# Patient Record
Sex: FEMALE | Race: WHITE | Employment: OTHER | URBAN - METROPOLITAN AREA
[De-identification: names, ages, dates, MRNs, and addresses within clinical notes are randomized per-mention and may not be internally consistent; named-entity substitution may affect disease eponyms.]

---

## 2021-05-04 PROBLEM — M26.609 TMJ DYSFUNCTION: Status: ACTIVE | Noted: 2021-05-04

## 2021-05-04 PROBLEM — G50.9 TRIGEMINAL NEUROPATHY: Status: ACTIVE | Noted: 2021-05-04

## 2021-05-12 ENCOUNTER — HOSPITAL ENCOUNTER (OUTPATIENT)
Dept: MRI IMAGING | Age: 71
Discharge: HOME | End: 2021-05-12
Attending: PSYCHIATRY & NEUROLOGY
Payer: MEDICARE

## 2021-05-12 DIAGNOSIS — G50.9 TRIGEMINAL NEUROPATHY: ICD-10-CM

## 2021-05-12 DIAGNOSIS — M26.609 TMJ DYSFUNCTION: ICD-10-CM

## 2021-05-12 PROCEDURE — 74011636320 HC RX REV CODE- 636/320: Performed by: PSYCHIATRY & NEUROLOGY

## 2021-05-12 PROCEDURE — 70553 MRI BRAIN STEM W/O & W/DYE: CPT

## 2021-05-12 PROCEDURE — A9576 INJ PROHANCE MULTIPACK: HCPCS | Performed by: PSYCHIATRY & NEUROLOGY

## 2021-05-12 RX ORDER — SODIUM CHLORIDE 0.9 % (FLUSH) 0.9 %
10 SYRINGE (ML) INJECTION
Status: COMPLETED | OUTPATIENT
Start: 2021-05-12 | End: 2021-05-12

## 2021-05-12 RX ADMIN — GADOTERIDOL 10 ML: 279.3 INJECTION, SOLUTION INTRAVENOUS at 12:33

## 2021-05-12 RX ADMIN — Medication 10 ML: at 12:33

## 2021-06-11 PROBLEM — G50.0 TRIGEMINAL NEURALGIA OF LEFT SIDE OF FACE: Status: ACTIVE | Noted: 2021-06-11

## 2022-03-18 PROBLEM — G50.9 TRIGEMINAL NEUROPATHY: Status: ACTIVE | Noted: 2021-05-04

## 2022-03-19 PROBLEM — G50.0 TRIGEMINAL NEURALGIA OF LEFT SIDE OF FACE: Status: ACTIVE | Noted: 2021-06-11

## 2022-03-19 PROBLEM — M26.609 TMJ DYSFUNCTION: Status: ACTIVE | Noted: 2021-05-04

## 2022-07-05 ENCOUNTER — TELEPHONE (OUTPATIENT)
Dept: NEUROLOGY | Age: 72
End: 2022-07-05

## 2022-07-05 DIAGNOSIS — R13.10 DYSPHAGIA, UNSPECIFIED TYPE: Primary | ICD-10-CM

## 2022-07-05 NOTE — TELEPHONE ENCOUNTER
Patient called stating that she is having lots of issues swallowing. States she has not been able to take the new medication she was prescribed, she could not verify which medication it was only that it is 25 mg. States that at her last appointment she was given a shot which usually helps but this time she has not seen a difference. Patient also states that this is caused by an issue with her mandibular nerve which is causing issue with her trigeminal nerve. Patient states that when she eats or tries to take meds her \"throat draws up\" and she has issues swallowing. Patient is requesting callback, pls adv.

## 2022-07-20 ENCOUNTER — OFFICE VISIT (OUTPATIENT)
Dept: NEUROLOGY | Age: 72
End: 2022-07-20

## 2022-07-20 ENCOUNTER — OFFICE VISIT (OUTPATIENT)
Dept: NEUROLOGY | Age: 72
End: 2022-07-20
Payer: MEDICARE

## 2022-07-20 VITALS — SYSTOLIC BLOOD PRESSURE: 134 MMHG | DIASTOLIC BLOOD PRESSURE: 77 MMHG | HEART RATE: 64 BPM

## 2022-07-20 DIAGNOSIS — M62.838 MUSCLE SPASM: ICD-10-CM

## 2022-07-20 DIAGNOSIS — R13.10 DYSPHAGIA, UNSPECIFIED TYPE: Primary | ICD-10-CM

## 2022-07-20 DIAGNOSIS — G50.9 TRIGEMINAL NEUROPATHY: Primary | ICD-10-CM

## 2022-07-20 DIAGNOSIS — G50.0 TRIGEMINAL NEURALGIA OF LEFT SIDE OF FACE: ICD-10-CM

## 2022-07-20 PROCEDURE — 1123F ACP DISCUSS/DSCN MKR DOCD: CPT | Performed by: PSYCHIATRY & NEUROLOGY

## 2022-07-20 PROCEDURE — 1090F PRES/ABSN URINE INCON ASSESS: CPT | Performed by: PSYCHIATRY & NEUROLOGY

## 2022-07-20 PROCEDURE — G8400 PT W/DXA NO RESULTS DOC: HCPCS | Performed by: PSYCHIATRY & NEUROLOGY

## 2022-07-20 PROCEDURE — 3017F COLORECTAL CA SCREEN DOC REV: CPT | Performed by: PSYCHIATRY & NEUROLOGY

## 2022-07-20 PROCEDURE — 1036F TOBACCO NON-USER: CPT | Performed by: PSYCHIATRY & NEUROLOGY

## 2022-07-20 PROCEDURE — G8421 BMI NOT CALCULATED: HCPCS | Performed by: PSYCHIATRY & NEUROLOGY

## 2022-07-20 PROCEDURE — 99214 OFFICE O/P EST MOD 30 MIN: CPT | Performed by: PSYCHIATRY & NEUROLOGY

## 2022-07-20 PROCEDURE — G8427 DOCREV CUR MEDS BY ELIG CLIN: HCPCS | Performed by: PSYCHIATRY & NEUROLOGY

## 2022-07-20 ASSESSMENT — ENCOUNTER SYMPTOMS
RESPIRATORY NEGATIVE: 1
EYES NEGATIVE: 1
BACK PAIN: 1
GASTROINTESTINAL NEGATIVE: 1

## 2022-07-20 ASSESSMENT — VISUAL ACUITY: VA_NORMAL: 1

## 2022-07-20 NOTE — PROGRESS NOTES
Speech Pathology    Patient arrived for scheduled evaluation but very confused as to why she was here. She indicated difficulty swallowing but knows this is due to her trigeminal neuralgia. She has endorsed continued difficulty with swallowing after she underwent surgeries to manage this. She indicates that when she swallows her neck muscles begin to spasm. She also indicates this happening even if she is not eating. During the interview, no spasms was observed. We discussed treatment options for managing this such as avoiding trigger foods and also foods that will cause repetitive chewing motions that could aggravate other nerves. She does have history of TMJ as well. I discussed the above with Dr. Forrest Petersen. Question if Botox in the mandibular region would help with this. Dr. Forrest Petersen will f/u with patient re: Botox and other treatment strategies. Jennifer Barreto

## 2022-07-20 NOTE — PROGRESS NOTES
7/20/2022  Maine Cronin 67 y.o. female      Chief Complaint:  Chief Complaint   Patient presents with    Follow-up    Spasms     Lower facial muscle spasm, bilateral          Followup Note:   Episodes of oral muscles spasm not able to talk or swallow, lasting few seconds, mostly occurred during eating, occasionally during talking. Daily and several times a day. Spells of lower facial spasm began after the decompression which was done in January 2021, symptoms of left facial burning pain developed after the last gamma knife treatment later the year of 2021. Review Test Results: I have reviewed imaging study and lab tests, and clinical summaries in care everywhere. Current Outpatient Medications   Medication Sig Dispense Refill    Biotin 2.5 MG CAPS Take by mouth      Calcium Carbonate-Vitamin D (CALCIUM-VITAMIN D) 600-125 MG-UNIT TABS Take by mouth      Cholecalciferol 50 MCG (2000 UT) TABS Take by mouth      cyanocobalamin 100 MCG tablet Take 100 mcg by mouth daily       No current facility-administered medications for this visit. Allergies   Allergen Reactions    Morphine Hives     Other reaction(s): hives                                   Other reaction(s): hives                                     Phenytoin Sodium Extended Other (See Comments)     Injection site reactions   Arm pain severe  Injection site reactions   Arm pain severe    Carbamazepine Palpitations         Review of Systems:  Review of Systems   Constitutional: Negative. HENT:  Positive for hearing loss. Eyes: Negative. Respiratory: Negative. Cardiovascular: Negative. Gastrointestinal: Negative. Genitourinary: Negative. Musculoskeletal:  Positive for back pain and neck pain. Skin: Negative. Neurological:  Positive for tingling and headaches. Endo/Heme/Allergies: Negative. Psychiatric/Behavioral:  Positive for memory loss. The patient has insomnia.           Examination:  Vitals:    07/20/22 1525   BP: 134/77   Pulse: 64        Physical Exam  Constitutional:       Appearance: She is normal weight. Eyes:      Extraocular Movements: Extraocular movements intact. Conjunctiva/sclera: Conjunctivae normal.      Pupils: Pupils are equal, round, and reactive to light. Pulmonary:      Effort: Pulmonary effort is normal.   Musculoskeletal:         General: Normal range of motion. Cervical back: Normal range of motion. Skin:     General: Skin is warm and dry. Neurological:      Mental Status: She is alert and oriented to person, place, and time. Mental status is at baseline. Cranial Nerves: Cranial nerve deficit present. Motor: No weakness. Coordination: Coordination normal.      Gait: Gait normal.   Psychiatric:         Mood and Affect: Mood normal.         Speech: Speech normal.         Behavior: Behavior normal.         Thought Content: Thought content normal.        Neurologic Exam     Mental Status   Oriented to person, place, and time. Concentration: normal.   Speech: speech is normal   Level of consciousness: alert  Knowledge: good. Normal comprehension. Cranial Nerves     CN II   Visual fields full to confrontation. Visual acuity: normal  Right visual field deficit: none quadrant(s)  Left visual field deficit: none quadrant(s)    CN III, IV, VI   Pupils are equal, round, and reactive to light. Right pupil: Size: 3 mm. Shape: regular. Left pupil: Size: 3 mm. Shape: regular. Nystagmus: none   Diplopia: none  Ophthalmoparesis: none  Upgaze: normal    CN V   Left facial sensation deficit: complete    CN VII   Facial expression full, symmetric. CN VIII   CN VIII normal.     CN IX, X   CN IX normal.   CN X normal.     CN XI   CN XI normal.           Assessment / Plan:    Isaías Hart was seen today for follow-up and spasms.     Diagnoses and all orders for this visit:    Trigeminal neuropathy    Trigeminal neuralgia of left side of face    Muscle spasm     Lidocaine gel before meal time  Botox therapy to relax masseter and lateral pterygoid, and probable orbicularis auris, 35not sure if SCM affected, need to review her video clip of spells. Note is that she stopped taking meds to see if spasms were side effect related. I have spent 35 min, greater than 50% of discussing and counseling with patient, for treatment and diagnostic plan review.

## 2022-08-01 ENCOUNTER — TELEPHONE (OUTPATIENT)
Dept: NEUROLOGY | Age: 72
End: 2022-08-01

## 2022-08-15 ENCOUNTER — TELEPHONE (OUTPATIENT)
Dept: NEUROLOGY | Age: 72
End: 2022-08-15

## 2022-08-15 DIAGNOSIS — G50.0 TRIGEMINAL NEURALGIA OF LEFT SIDE OF FACE: ICD-10-CM

## 2022-08-15 DIAGNOSIS — G50.9 TRIGEMINAL NEUROPATHY: Primary | ICD-10-CM

## 2022-08-15 NOTE — TELEPHONE ENCOUNTER
Patient is requesting a muscle relaxer be called into [facility] in [place]. She hasn't used one before but she heard Dr Kassidy Anand and Grayson Boxer discussing that it might benefit her.

## 2022-08-16 RX ORDER — BACLOFEN 10 MG/1
TABLET ORAL
Qty: 90 TABLET | Refills: 2 | Status: SHIPPED | OUTPATIENT
Start: 2022-08-16

## 2022-08-16 NOTE — TELEPHONE ENCOUNTER
Requested Prescriptions     Signed Prescriptions Disp Refills    baclofen (LIORESAL) 10 MG tablet 90 tablet 2     Si/2 tab tid x 5 days, then 1 tid. Dosage can be increased gradually up to 20 mg tid.      Authorizing Provider: Sadaf Ferris

## 2022-09-07 ENCOUNTER — TELEPHONE (OUTPATIENT)
Dept: NEUROLOGY | Age: 72
End: 2022-09-07

## 2022-09-16 ENCOUNTER — OFFICE VISIT (OUTPATIENT)
Dept: NEUROLOGY | Age: 72
End: 2022-09-16
Payer: MEDICARE

## 2022-09-16 VITALS — HEART RATE: 62 BPM | WEIGHT: 109 LBS | SYSTOLIC BLOOD PRESSURE: 109 MMHG | DIASTOLIC BLOOD PRESSURE: 64 MMHG

## 2022-09-16 DIAGNOSIS — M43.6 TORTICOLLIS: ICD-10-CM

## 2022-09-16 DIAGNOSIS — G50.0 TRIGEMINAL NEURALGIA OF LEFT SIDE OF FACE: ICD-10-CM

## 2022-09-16 DIAGNOSIS — G44.221 CHRONIC TENSION-TYPE HEADACHE, INTRACTABLE: ICD-10-CM

## 2022-09-16 DIAGNOSIS — G51.39 FACIAL SPASM: ICD-10-CM

## 2022-09-16 DIAGNOSIS — F45.8 BRUXISM: Primary | ICD-10-CM

## 2022-09-16 PROCEDURE — 64612 DESTROY NERVE FACE MUSCLE: CPT | Performed by: PSYCHIATRY & NEUROLOGY

## 2022-09-16 PROCEDURE — G8428 CUR MEDS NOT DOCUMENT: HCPCS | Performed by: PSYCHIATRY & NEUROLOGY

## 2022-09-16 PROCEDURE — 3017F COLORECTAL CA SCREEN DOC REV: CPT | Performed by: PSYCHIATRY & NEUROLOGY

## 2022-09-16 PROCEDURE — 99214 OFFICE O/P EST MOD 30 MIN: CPT | Performed by: PSYCHIATRY & NEUROLOGY

## 2022-09-16 PROCEDURE — 64616 CHEMODENERV MUSC NECK DYSTON: CPT | Performed by: PSYCHIATRY & NEUROLOGY

## 2022-09-16 PROCEDURE — 1090F PRES/ABSN URINE INCON ASSESS: CPT | Performed by: PSYCHIATRY & NEUROLOGY

## 2022-09-16 PROCEDURE — G8400 PT W/DXA NO RESULTS DOC: HCPCS | Performed by: PSYCHIATRY & NEUROLOGY

## 2022-09-16 PROCEDURE — 1036F TOBACCO NON-USER: CPT | Performed by: PSYCHIATRY & NEUROLOGY

## 2022-09-16 PROCEDURE — G8421 BMI NOT CALCULATED: HCPCS | Performed by: PSYCHIATRY & NEUROLOGY

## 2022-09-16 PROCEDURE — 1123F ACP DISCUSS/DSCN MKR DOCD: CPT | Performed by: PSYCHIATRY & NEUROLOGY

## 2022-09-16 NOTE — PROGRESS NOTES
to FDA standards. Technique: Botox A 200 unit was dissolved into non-preservative normal saline 4 ml. Gauge 30 facial needles were used for the injection. Procedure: Total *** units were injected, *** units discarded/ unavoidable. Five units/ 0.1 ml per site unless specified. Occipitalis R3 L3. Cervical paraspinal R2 L2. Trapezius R3 L3. Temporalis R4 L4. Frontalis R2 L2. Procerus 1. Corrugators 3.5 units/ 0.07 ml R1 L1. Additional upper frontalis R*** L***, upper temporalis R*** L***, and upper occipitalis R*** L***. Patient tolerated the procedure well. She was instructed to call for any discomfort related to the injection. Assessment / Plan:    There are no diagnoses linked to this encounter. I have spent *** min, greater than 50% of discussing and counseling with patient, for treatment and diagnostic plan review.           Sodium Chloride 0.9%  Lot # -[id number]  Exp: 11/01/2023  NDC: 4429-9563-99

## 2022-09-16 NOTE — PROGRESS NOTES
9/16/2022  Berta Ventura 67 y.o. female      Chief Complaint:  Chief Complaint   Patient presents with    Follow-up    Migraine     Botox injections          History of Present Illness:  Botox treatment first time for the spells clinching teeth lasting seconds multiple times a day, about 4 days per week. Eating and talking could trigger, talking while eating higher risk to have the spell, cold food triggers trigeminal neuralgia, trigeminal neuralgia with needles pain could trigger clinching teeth and mouth. Today will be her first treatment. Left-sided episodic frontal headache extending to temporal and neck on a daily basis, and left-sided neck pain. Above for at least 3 years. Has left trigeminal neuralgia, bilateral TMJ disorder surgery, left internal auditory surgery for Ménière's disease. Review Test Results: I have reviewed imaging study and lab tests- CBC CMP normal range. Current Outpatient Medications   Medication Sig Dispense Refill    baclofen (LIORESAL) 10 MG tablet 1/2 tab tid x 5 days, then 1 tid. Dosage can be increased gradually up to 20 mg tid. 90 tablet 2    Biotin 2.5 MG CAPS Take by mouth      Calcium Carbonate-Vitamin D (CALCIUM-VITAMIN D) 600-125 MG-UNIT TABS Take by mouth      Cholecalciferol 50 MCG (2000 UT) TABS Take by mouth      cyanocobalamin 100 MCG tablet Take 100 mcg by mouth daily       No current facility-administered medications for this visit. Allergies   Allergen Reactions    Morphine Hives     Other reaction(s): hives                                   Other reaction(s): hives                                     Phenytoin Sodium Extended Other (See Comments)     Injection site reactions   Arm pain severe  Injection site reactions   Arm pain severe    Carbamazepine Palpitations         Review of Systems:  Review of Systems   Musculoskeletal:  Positive for neck pain. Severe episodes of bruxism and lower facial spasm.     Neurological:  Positive for sensory change and headaches. Examination:  Vitals:    09/16/22 1122   BP: 109/64   Pulse: 62   Weight: 109 lb (49.4 kg)        Physical Exam  Constitutional:       Appearance: She is normal weight. Eyes:      Extraocular Movements: Extraocular movements intact. Conjunctiva/sclera: Conjunctivae normal.      Pupils: Pupils are equal, round, and reactive to light. Cardiovascular:      Rate and Rhythm: Normal rate. Pulmonary:      Effort: Pulmonary effort is normal.   Musculoskeletal:         General: Normal range of motion. Cervical back: Normal range of motion. Tenderness present. Skin:     General: Skin is warm and dry. Neurological:      Mental Status: She is alert and oriented to person, place, and time. Mental status is at baseline. Cranial Nerves: No cranial nerve deficit. Sensory: No sensory deficit. Motor: No weakness. Coordination: Coordination normal.      Gait: Gait normal.   Psychiatric:         Mood and Affect: Mood normal.         Behavior: Behavior normal.         Thought Content: Thought content normal.         Judgment: Judgment normal.        Neurologic Exam     Mental Status   Oriented to person, place, and time. Cranial Nerves     CN III, IV, VI   Pupils are equal, round, and reactive to light. Botox Procedure Note    Indication: Intractable migraines. Consent: Written consent obtained after the potential risks and benefits have been explained to the patient. Potential risks include:  Pain, bruising, bleeding, infection, flu-like symptoms, over-weakening of injected or adjacent muscles and swallowing dysfunction. Patient was given the botulinum toxin medication guide according to FDA standards. Technique: Botox A 200 unit was dissolved into non-preservative normal saline 4 ml. Gauge 30 facial needles were used for the injection. Procedure: Total 168 units were injected, 32 units discarded/ unavoidable.  Five units/ 0.1 ml per site unless specified. Occipitalis R0 L3. Cervical paraspinal R0 L2. Trapezius 6 units R3 L3. Temporalis R0 L4. Frontalis R2 L2. Procerus 1. Corrugators 3.5 units/ 0.07 ml R1 L1. Additional posterior temporalis L1. Masseter bilateral 7.5 units x 2 sites, plus 5 units x 1 site. Nasalis 5 units L1. TMJ disorder Lateral Pterygoid 5 units L1. Patient tolerated the procedure well. She was instructed to call for any discomfort related to the injection. Assessment / Plan:    Xiao Arevalo was seen today for follow-up and migraine. Diagnoses and all orders for this visit:    Bruxism  -     85238 - Chemodenervation of muscle(s): Innervated by facial nerve, unilateral    Trigeminal neuralgia of left side of face  -     Vitamin B12; Future  -     56676 - Chemodenervation of muscle(s): Innervated by facial nerve, unilateral    Facial spasm  -     02327 - Chemodenervation of muscle(s): Innervated by facial nerve, unilateral    Chronic tension-type headache, intractable   -     42451 - Chemodenervation of muscle(s): Innervated by facial nerve, unilateral    Torticollis  -     47426 - Chemodenervation of neck muscle(s) (UNILATERAL)     Patient recently developed episodic bilateral lower facial spasm likely triggered by severe bruxism. In addition, she has intractable trigeminal neuralgia on the left side and chronic episodic tension headaches on a daily basis on the left side. Neck pain/torticollis on the same side, left side. Has received the first Botox therapy today covering above problems. I have spent 40 min, greater than 50% of discussing and counseling with patient, for treatment and diagnostic plan review.

## 2022-10-03 ENCOUNTER — TELEPHONE (OUTPATIENT)
Dept: NEUROLOGY | Age: 72
End: 2022-10-03

## 2022-10-03 ENCOUNTER — HOSPITAL ENCOUNTER (EMERGENCY)
Age: 72
Discharge: HOME | End: 2022-10-03
Attending: EMERGENCY MEDICINE
Payer: MEDICARE

## 2022-10-03 ENCOUNTER — HOSPITAL ENCOUNTER (EMERGENCY)
Age: 72
Discharge: HOME | End: 2022-10-03

## 2022-10-03 ENCOUNTER — OFFICE VISIT (OUTPATIENT)
Dept: NEUROLOGY | Age: 72
End: 2022-10-03
Payer: MEDICARE

## 2022-10-03 VITALS — SYSTOLIC BLOOD PRESSURE: 136 MMHG | HEART RATE: 72 BPM | DIASTOLIC BLOOD PRESSURE: 76 MMHG | WEIGHT: 109 LBS

## 2022-10-03 VITALS
HEART RATE: 67 BPM | BODY MASS INDEX: 21.2 KG/M2 | RESPIRATION RATE: 20 BRPM | SYSTOLIC BLOOD PRESSURE: 130 MMHG | HEIGHT: 60 IN | WEIGHT: 108 LBS | DIASTOLIC BLOOD PRESSURE: 72 MMHG | OXYGEN SATURATION: 99 % | TEMPERATURE: 98.3 F

## 2022-10-03 DIAGNOSIS — R25.2 TRISMUS: Primary | ICD-10-CM

## 2022-10-03 DIAGNOSIS — G51.33 CLONIC HEMIFACIAL SPASM, BILATERAL: ICD-10-CM

## 2022-10-03 DIAGNOSIS — F45.8 BRUXISM: Primary | ICD-10-CM

## 2022-10-03 DIAGNOSIS — G51.39 FACIAL SPASM: ICD-10-CM

## 2022-10-03 PROCEDURE — 95874 GUIDE NERV DESTR NEEDLE EMG: CPT | Performed by: PSYCHIATRY & NEUROLOGY

## 2022-10-03 PROCEDURE — 1123F ACP DISCUSS/DSCN MKR DOCD: CPT | Performed by: PSYCHIATRY & NEUROLOGY

## 2022-10-03 PROCEDURE — 1090F PRES/ABSN URINE INCON ASSESS: CPT | Performed by: PSYCHIATRY & NEUROLOGY

## 2022-10-03 PROCEDURE — 1036F TOBACCO NON-USER: CPT | Performed by: PSYCHIATRY & NEUROLOGY

## 2022-10-03 PROCEDURE — G8428 CUR MEDS NOT DOCUMENT: HCPCS | Performed by: PSYCHIATRY & NEUROLOGY

## 2022-10-03 PROCEDURE — 99284 EMERGENCY DEPT VISIT MOD MDM: CPT

## 2022-10-03 PROCEDURE — 64612 DESTROY NERVE FACE MUSCLE: CPT | Performed by: PSYCHIATRY & NEUROLOGY

## 2022-10-03 PROCEDURE — G8484 FLU IMMUNIZE NO ADMIN: HCPCS | Performed by: PSYCHIATRY & NEUROLOGY

## 2022-10-03 PROCEDURE — 99215 OFFICE O/P EST HI 40 MIN: CPT | Performed by: PSYCHIATRY & NEUROLOGY

## 2022-10-03 PROCEDURE — G8421 BMI NOT CALCULATED: HCPCS | Performed by: PSYCHIATRY & NEUROLOGY

## 2022-10-03 PROCEDURE — 6360000002 HC RX W HCPCS: Performed by: EMERGENCY MEDICINE

## 2022-10-03 PROCEDURE — 3017F COLORECTAL CA SCREEN DOC REV: CPT | Performed by: PSYCHIATRY & NEUROLOGY

## 2022-10-03 PROCEDURE — G8400 PT W/DXA NO RESULTS DOC: HCPCS | Performed by: PSYCHIATRY & NEUROLOGY

## 2022-10-03 PROCEDURE — 96372 THER/PROPH/DIAG INJ SC/IM: CPT

## 2022-10-03 RX ORDER — DIAZEPAM 5 MG/ML
5 INJECTION, SOLUTION INTRAMUSCULAR; INTRAVENOUS EVERY 4 HOURS PRN
Status: DISCONTINUED | OUTPATIENT
Start: 2022-10-03 | End: 2022-10-03 | Stop reason: HOSPADM

## 2022-10-03 RX ORDER — DIAZEPAM 2 MG/1
2 TABLET ORAL 3 TIMES DAILY PRN
Qty: 90 TABLET | Refills: 2 | Status: SHIPPED | OUTPATIENT
Start: 2022-10-03 | End: 2023-01-01

## 2022-10-03 RX ORDER — DIAZEPAM 2 MG/1
2 TABLET ORAL EVERY 6 HOURS PRN
Status: DISCONTINUED | OUTPATIENT
Start: 2022-10-03 | End: 2022-10-03

## 2022-10-03 RX ADMIN — DIAZEPAM 5 MG: 5 INJECTION, SOLUTION INTRAMUSCULAR; INTRAVENOUS at 12:32

## 2022-10-03 ASSESSMENT — PAIN - FUNCTIONAL ASSESSMENT: PAIN_FUNCTIONAL_ASSESSMENT: 0-10

## 2022-10-03 ASSESSMENT — ENCOUNTER SYMPTOMS: TROUBLE SWALLOWING: 0

## 2022-10-03 ASSESSMENT — PAIN SCALES - GENERAL: PAINLEVEL_OUTOF10: 8

## 2022-10-03 NOTE — PROGRESS NOTES
10/3/2022  Lit Vasquez 67 y.o. female      Chief Complaint:  Chief Complaint   Patient presents with    Follow-up    Neurologic Problem          History of Present Illness:  Urgent visit for severe facial spasm. Botox treatment on 9/16/22 which prevented her from clinching spells and left temporal periorbital headaches/paresthesia. Side effect of left trapezius weakness and hard to lift up the left upper arm. Today, she developed perioral and left periorbital muscles spasms, unable to open the left eye and the mouth. Went ER accompanied by her daughter, received Valium shot there which relieved spasm completely. Came to office from ER, normal appearance. Has sleep disturbance. Review Test Results: I have reviewed imaging study and lab tests- none recent. Current Outpatient Medications   Medication Sig Dispense Refill    diazePAM (VALIUM) 2 MG tablet Take 1 tablet by mouth 3 times daily as needed for Anxiety for up to 90 days. 90 tablet 2    baclofen (LIORESAL) 10 MG tablet 1/2 tab tid x 5 days, then 1 tid. Dosage can be increased gradually up to 20 mg tid. 90 tablet 2    Biotin 2.5 MG CAPS Take by mouth      Calcium Carbonate-Vitamin D (CALCIUM-VITAMIN D) 600-125 MG-UNIT TABS Take by mouth      Cholecalciferol 50 MCG (2000 UT) TABS Take by mouth      cyanocobalamin 100 MCG tablet Take 100 mcg by mouth daily       No current facility-administered medications for this visit. Allergies   Allergen Reactions    Morphine Hives     Other reaction(s): hives                                   Other reaction(s): hives                                     Phenytoin Sodium Extended Other (See Comments)     Injection site reactions   Arm pain severe  Injection site reactions   Arm pain severe    Carbamazepine Palpitations         Review of Systems:  Review of Systems   Neurological:         Facial muscle spasm.        Examination:  Vitals:    10/03/22 1435   BP: 136/76   Pulse: 72   Weight: 109 lb (49.4 kg)        Physical Exam  Vitals reviewed. Constitutional:       Appearance: She is normal weight. Eyes:      Extraocular Movements: Extraocular movements intact. Conjunctiva/sclera: Conjunctivae normal.      Pupils: Pupils are equal, round, and reactive to light. Cardiovascular:      Rate and Rhythm: Normal rate. Pulmonary:      Effort: Pulmonary effort is normal.   Musculoskeletal:         General: Normal range of motion. Cervical back: Normal range of motion. Skin:     General: Skin is warm and dry. Neurological:      Mental Status: She is alert and oriented to person, place, and time. Mental status is at baseline. Cranial Nerves: No cranial nerve deficit. Sensory: No sensory deficit. Motor: No weakness. Gait: Gait normal.      Comments: Facial movement normal. Masseter not bulging. Open/close mouth freely. Psychiatric:         Mood and Affect: Mood normal.         Behavior: Behavior normal.         Thought Content: Thought content normal.         Judgment: Judgment normal.        Neurologic Exam     Mental Status   Oriented to person, place, and time. Cranial Nerves     CN III, IV, VI   Pupils are equal, round, and reactive to light. Xeomin Procedure Note    Indication: Intractable migraines. Consent: Written consent obtained after the potential risks and benefits have been explained to the patient. Potential risks include:  Pain, bruising, bleeding, infection, flu-like symptoms, over-weakening of injected or adjacent muscles and swallowing dysfunction. Patient was given the botulinum toxin medication guide according to FDA standards. Technique: Xeomin 50 unit was dissolved into non-preservative normal saline 1 ml. Gauge 30 facial injectable needle was connected with audible EMG for the injection. Procedure: Total 45 units were injected, 5 units discarded/ unavoidable.  Mentalis 10 units R1 L1, depressor labii inferioris 5 units R1 L1, orbicularis oris 5 units lateral R1 L1, medial 2.5 units R1 L1. Patient tolerated the procedure well. She was instructed to call for any discomfort related to the injection. Assessment / Plan:    Daniela Cota was seen today for follow-up and neurologic problem. Diagnoses and all orders for this visit:    Bruxism  -     incobotulinumtoxinA (XEOMIN) injection 50 Units  -     diazePAM (VALIUM) 2 MG tablet; Take 1 tablet by mouth 3 times daily as needed for Anxiety for up to 90 days. Facial spasm  -     diazePAM (VALIUM) 2 MG tablet; Take 1 tablet by mouth 3 times daily as needed for Anxiety for up to 90 days. -     63072 - Chemodenervation of muscle(s): innervated by facial, trigeminal, cervical spinal and accessory nerves, bilateral  -     78296 - EMG, chemodenervation    Clonic hemifacial spasm, bilateral   -     89424 - Chemodenervation of muscle(s): innervated by facial, trigeminal, cervical spinal and accessory nerves, bilateral  -     31483 - EMG, chemodenervation     Additional botulinum toxin 45 units was injected periorally to prevent oral muscle spasm. In ER for severe spell of bilateral oral muscle spasm and not able to open the mouth. The spell was relieved by intramuscular Valium. Prescription of low dosage of Valium was given today. I have spent 40 min, greater than 50% of discussing and counseling with patient, for treatment and diagnostic plan review.           Sodium Chloride 0.9%  Lot # -[id number]  Exp: 11/01/2023  NDC: 4975-2295-40

## 2022-10-03 NOTE — ED PROVIDER NOTES
Emergency Department Provider Note                   PCP:                None Provider               Age: 67 y.o. Sex: female       ICD-10-CM    1. Trismus  R25.2           DISPOSITION Decision To Discharge 10/03/2022 01:40:04 PM       MDM  Number of Diagnoses or Management Options  Trismus  Diagnosis management comments: Masseter muscle spasm, trigeminal neuralgia,       Amount and/or Complexity of Data Reviewed  Tests in the medicine section of CPT®: ordered and reviewed  Obtain history from someone other than the patient: yes  Review and summarize past medical records: yes  Discuss the patient with other providers: yes         ED Course as of 10/03/22 1340   Mon Oct 03, 2022   1231 I spoke to Dr. Rolando Nelson the neurologist and she says that she can get the patient into her office today to do additional Botox injections which would help. She does recommend giving the patient Valium here to see if that will help break the muscle spasm as well. We discussed blood work and imaging but she does not feel as though those would be helpful. [LY]   1790 The patient [HJ]   5904 Out as the muscle spasms seem to be getting worse on the left side of her face's daughter called. Patient now not able to open her mouth at all. I discussed the use of the Valium here in the emergency department and observation to see if this will help. [ZU]   0397 Patient unable to open her mouth up to 1/2 to 2 cm. She is talking much better no longer has jaw clenching. She will be discharged to her neurologist office. [KH]      ED Course User Index  [KH] Stacy Whaley, DO        No orders of the defined types were placed in this encounter.        Medications   diazePAM (VALIUM) injection 5 mg (5 mg IntraMUSCular Given 10/3/22 1232)       New Prescriptions    No medications on file        Tracey Tirado is a 67 y.o. female who presents to the Emergency Department with chief complaint of    Chief Complaint   Patient presents with Allergic Reaction      Patient is a 68-year-old female presenting to the emergency department today complaining of inability to open her mouth. The patient states that for the last couple of years she has been dealing with trigeminal neuralgia and had a surgery done in February 2020. Since then she is had issues which have been unresolved with muscle spasm and pain. She is following with neurology and just had Botox injections done on 16 September. She had about 10 days relief but since then her symptoms have progressively worsened. They called the neurologist office today but because of the muscle spasm worsening decided to be evaluated in the emergency department. All other systems reviewed and are negative unless otherwise stated in the history of present illness section. Review of Systems   HENT:  Negative for tinnitus and trouble swallowing. Trismus   All other systems reviewed and are negative. No past medical history on file. No past surgical history on file. No family history on file. Social History     Socioeconomic History    Marital status:         Allergies: Patient has no known allergies. Previous Medications    No medications on file        Vitals signs and nursing note reviewed. Patient Vitals for the past 4 hrs:   Temp Pulse Resp BP SpO2   10/03/22 1134 98.3 °F (36.8 °C) 67 20 130/72 99 %          Physical Exam GENERAL:The patient has Body mass index is 21.09 kg/m². Well-hydrated. No acute distress. VITAL SIGNS: Heart rate, blood pressure, respiratory rate reviewed as recorded in  nurse's notes  EYES: Pupils reactive. Extraocular motion intact. No conjunctival redness or drainage. NOSE: No epistaxis present  MOUTH: Jaw clenching on arrival.  Initially patient able to actively open her mouth about half a centimeter between the front teeth.   When I tried to take her jaw through a passive range of motion however her jaw was clenched with her teeth overlapping I could not get her to relax in order to open it at all. Floor the mouth is soft. Uvula midline. I was able to visualize the posterior pharynx when she is able to open her own mouth and there is no swelling in the tongue or posterior pharynx appreciated. NECK: Muscle spasm anterior sternocleidomastoid on the left greater than the right. Reproducible tenderness palpation noted. Trachea midline. LUNGS: No accessory muscle use  CARDIOVASCULAR: Regular rate and rhythm  EXTREMITIES: Pt moving all 4 extremities with out limitations. Normal muscle tone. NEUROLOGIC: Cranial nerve exam reveals face is symmetrical, tongue is midline  speech is clear. No focal deficits noted  SKIN: No rash or petechiae. Good skin turgor palpated. PSYCHIATRIC: Alert and oriented. Appropriate behavior and judgment. Procedures      No results found for any visits on 10/03/22. No orders to display                         Voice dictation software was used during the making of this note. This software is not perfect and grammatical and other typographical errors may be present. This note has not been completely proofread for errors.        Flavia Gregory, DO  10/03/22 8001

## 2022-10-03 NOTE — ED TRIAGE NOTES
Pt to ER with daughter who states pt has hx of trigeminal neuralgia and started having jaw tightness on Friday. States she has not been able to open mouth correctly since then but this morning started having some swelling in throat and swallowing does not feel normal. States she has had some SOB and daughter states symptoms do not usually get this bad.

## 2022-10-03 NOTE — TELEPHONE ENCOUNTER
Patients mouth is not closing and her teeth are not touching so she cannot chew. Daughter is requesting she be seen today. She had botox on 9/16/22. Clothing

## 2022-10-03 NOTE — DISCHARGE INSTRUCTIONS
Go directly to to [address] to see your neurology specialist for Botox injections today. Ask her about a prescription for emergency breakthrough Valium for muscle spasm if this happens again.

## 2022-10-03 NOTE — ED NOTES
I have reviewed discharge instructions with the patient. The patient verbalized understanding. Patient left ED via Discharge Method: ambulatory to Home with family. Opportunity for questions and clarification provided. Patient given 0 scripts. To continue your aftercare when you leave the hospital, you may receive an automated call from our care team to check in on how you are doing. This is a free service and part of our promise to provide the best care and service to meet your aftercare needs.  If you have questions, or wish to unsubscribe from this service please call [phone]. Thank you for Choosing our [facility] Emergency Department.         Patti Pickett RN  10/03/22 0111

## 2022-10-05 ENCOUNTER — TELEPHONE (OUTPATIENT)
Dept: NEUROLOGY | Age: 72
End: 2022-10-05

## 2022-10-05 NOTE — TELEPHONE ENCOUNTER
Xuan Victoria 4 minutes ago (12:20 PM)     SM   [id number] Tablet approved from 01/01/2022 - 12/31/2022. Sarah cerna.

## 2022-10-05 NOTE — TELEPHONE ENCOUNTER
Patients daughter lvm that she needs a PA for Valium. She needs it done today as she really needs the medication. Please call Sarah back with status ASAP.

## 2022-10-06 ENCOUNTER — TELEPHONE (OUTPATIENT)
Dept: NEUROLOGY | Age: 72
End: 2022-10-06

## 2022-10-06 NOTE — TELEPHONE ENCOUNTER
Patients daughter lvm that patient is still having issues with her mouth and her teeth not closing. When she eats the food falls out of her mouth. She took 2 muscle relaxer yesterday, 2 valium and a ativan tab that was the daughters prescription. She is requesting that she be seen while this is happening so you have a better understanding of what exactly is going on.

## 2022-10-06 NOTE — TELEPHONE ENCOUNTER
Spoke with patient, she took listed medication yesterday including Ativan, Valium and muscle relaxant which resulted in confusion this morning. Advised her to avoid Ativan, may avoid Valium as well. She reported unable to chew food and close mouth tightly, these are Botox effect and will prevent her from facial spasm. She may eat soft food. She was advised to go to ER for the same treatment if severe facial spasm recurs. Patient understood above conversation. Daughter has a trouble to access my chart.